# Patient Record
Sex: MALE | Race: WHITE | NOT HISPANIC OR LATINO | Employment: FULL TIME | URBAN - METROPOLITAN AREA
[De-identification: names, ages, dates, MRNs, and addresses within clinical notes are randomized per-mention and may not be internally consistent; named-entity substitution may affect disease eponyms.]

---

## 2021-06-21 ENCOUNTER — TELEPHONE (OUTPATIENT)
Dept: CARDIOLOGY | Facility: MEDICAL CENTER | Age: 61
End: 2021-06-21

## 2021-06-24 ENCOUNTER — OFFICE VISIT (OUTPATIENT)
Dept: CARDIOLOGY | Facility: MEDICAL CENTER | Age: 61
End: 2021-06-24
Payer: COMMERCIAL

## 2021-06-24 VITALS
HEART RATE: 78 BPM | OXYGEN SATURATION: 95 % | SYSTOLIC BLOOD PRESSURE: 166 MMHG | HEIGHT: 69 IN | DIASTOLIC BLOOD PRESSURE: 84 MMHG | BODY MASS INDEX: 31.1 KG/M2 | WEIGHT: 210 LBS

## 2021-06-24 DIAGNOSIS — R93.1 ELEVATED CORONARY ARTERY CALCIUM SCORE: ICD-10-CM

## 2021-06-24 DIAGNOSIS — E78.5 HYPERLIPIDEMIA, UNSPECIFIED HYPERLIPIDEMIA TYPE: ICD-10-CM

## 2021-06-24 DIAGNOSIS — I10 WHITE COAT SYNDROME WITH HYPERTENSION: ICD-10-CM

## 2021-06-24 DIAGNOSIS — Z72.0 TOBACCO ABUSE: ICD-10-CM

## 2021-06-24 DIAGNOSIS — E11.9 TYPE 2 DIABETES MELLITUS WITHOUT COMPLICATION, WITHOUT LONG-TERM CURRENT USE OF INSULIN (HCC): ICD-10-CM

## 2021-06-24 PROCEDURE — 93000 ELECTROCARDIOGRAM COMPLETE: CPT | Performed by: INTERNAL MEDICINE

## 2021-06-24 PROCEDURE — 99204 OFFICE O/P NEW MOD 45 MIN: CPT | Performed by: INTERNAL MEDICINE

## 2021-06-24 RX ORDER — ATORVASTATIN CALCIUM 10 MG/1
10 TABLET, FILM COATED ORAL NIGHTLY
COMMUNITY

## 2021-06-24 NOTE — PROGRESS NOTES
Chief Complaint   Patient presents with   • Abnormal Cardiac Function Testing     new patient       Subjective:   Jamin Gillis is a 61 y.o. male who presents today for initial consultation regarding abnormal coronary calcium scan.  Had a community screening test which revealed a coronary calcium score 260.  He is asymptomatic.  He has a history of borderline hypertension not on therapy, hyperlipidemia recently initiated on treatment after his calcium scan, tobacco abuse which is ongoing but no family history of precocious coronary artery disease other than his father.  He does not drink excessively and has not done any drugs in 10 years.  Feels well and is very active in his job.  No exertional complaints.    History reviewed. No pertinent past medical history.  History reviewed. No pertinent surgical history.  History reviewed. No pertinent family history.  Social History     Socioeconomic History   • Marital status: Single     Spouse name: Not on file   • Number of children: Not on file   • Years of education: Not on file   • Highest education level: Not on file   Occupational History   • Not on file   Tobacco Use   • Smoking status: Current Every Day Smoker   • Smokeless tobacco: Never Used   Substance and Sexual Activity   • Alcohol use: Not on file   • Drug use: Not on file   • Sexual activity: Not on file   Other Topics Concern   • Not on file   Social History Narrative   • Not on file     Social Determinants of Health     Financial Resource Strain:    • Difficulty of Paying Living Expenses:    Food Insecurity:    • Worried About Running Out of Food in the Last Year:    • Ran Out of Food in the Last Year:    Transportation Needs:    • Lack of Transportation (Medical):    • Lack of Transportation (Non-Medical):    Physical Activity:    • Days of Exercise per Week:    • Minutes of Exercise per Session:    Stress:    • Feeling of Stress :    Social Connections:    • Frequency of Communication with Friends  "and Family:    • Frequency of Social Gatherings with Friends and Family:    • Attends Judaism Services:    • Active Member of Clubs or Organizations:    • Attends Club or Organization Meetings:    • Marital Status:    Intimate Partner Violence:    • Fear of Current or Ex-Partner:    • Emotionally Abused:    • Physically Abused:    • Sexually Abused:      No Known Allergies  Outpatient Encounter Medications as of 6/24/2021   Medication Sig Dispense Refill   • Calcium Acetate, Phos Binder, (CALCIUM ACETATE PO) Take  by mouth.     • GARLIC OIL PO Take  by mouth.     • aspirin EC (ECOTRIN) 81 MG Tablet Delayed Response Take 81 mg by mouth every day.     • atorvastatin (LIPITOR) 10 MG Tab Take 10 mg by mouth every evening.       No facility-administered encounter medications on file as of 6/24/2021.     Review of Systems   All other systems reviewed and are negative.       Objective:   BP (!) 166/84 (BP Location: Left arm, Patient Position: Sitting)   Pulse 78   Ht 1.753 m (5' 9\")   Wt 95.3 kg (210 lb)   SpO2 95%   BMI 31.01 kg/m²     Physical Exam   Constitutional: He is oriented to person, place, and time. He appears well-developed. No distress.   HENT:   Head: Normocephalic and atraumatic.   Right Ear: External ear normal.   Left Ear: External ear normal.   Eyes: Pupils are equal, round, and reactive to light. Conjunctivae are normal. Right eye exhibits no discharge. Left eye exhibits no discharge. No scleral icterus.   Neck: No JVD present. No tracheal deviation present. No thyromegaly present.   Cardiovascular: Normal rate and regular rhythm. PMI is not displaced. Exam reveals no gallop and no friction rub.   No murmur heard.  Pulses:       Carotid pulses are 2+ on the right side and 2+ on the left side.       Radial pulses are 2+ on the left side.        Popliteal pulses are 2+ on the right side and 2+ on the left side.        Dorsalis pedis pulses are 2+ on the right side and 2+ on the left side.        " Posterior tibial pulses are 2+ on the right side and 2+ on the left side.   Pulmonary/Chest: Effort normal and breath sounds normal. No respiratory distress. He has no wheezes. He has no rales. He exhibits no tenderness.   Abdominal: Soft. Bowel sounds are normal. He exhibits no distension. There is no abdominal tenderness.   Musculoskeletal:         General: No swelling, tenderness or deformity. Normal range of motion.      Cervical back: Normal range of motion and neck supple.   Neurological: He is alert and oriented to person, place, and time. No cranial nerve deficit (cranial nerves II through XII grossly intact). Coordination normal.   Skin: Skin is warm and dry. No rash noted. He is not diaphoretic. No erythema. No pallor.   Psychiatric: His behavior is normal. Thought content normal.   Vitals reviewed.    EKG (6/24/2021):  I have personally reviewed the EKG this visit and discussed with the patient.  Sinus rhythm 73 bpm.  NORMAL ECG.    Labs 4/20/2021: Creatinine 0.85, AST 53, ALT 40, glucose 84, remainder of the complete metabolic panel is unremarkable.  , TG 86, HDL 48, , A1c 5.8 normal TSH.    Outside imaging body scan [facility] dated 4/27/2021 reviewed: Coronary calcium score 262.  Bilateral carotid artery plaquing left greater than right.  Possible 3.6 mm aneurysmal dilatation of the ascending aorta.      Assessment:     1. Elevated coronary artery calcium score  EC-ECHOCARDIOGRAM COMPLETE W/O CONT    US-CAROTID DOPPLER BILAT    Treadmill Stress   2. Hyperlipidemia, unspecified hyperlipidemia type  EKG    EC-ECHOCARDIOGRAM COMPLETE W/O CONT    US-CAROTID DOPPLER BILAT   3. Tobacco abuse  EC-ECHOCARDIOGRAM COMPLETE W/O CONT    US-CAROTID DOPPLER BILAT   4. White coat syndrome with hypertension  EC-ECHOCARDIOGRAM COMPLETE W/O CONT    US-CAROTID DOPPLER BILAT   5. Type 2 diabetes mellitus without complication, without long-term current use of insulin (HCC)         Medical Decision Making:   Today's Assessment / Status / Plan:     He is asymptomatic and active.  He has established coronary artery disease based on his coronary calcium score in the intermediate range at 262.  His ongoing tobacco abuse is a risk factor as well as likely underlying hypertension although it could just be whitecoat syndrome.  Indicates blood pressures in other situations have been normal.  Also has hyperlipidemia.  Was recently initiated on low-dose statin.  We discussed this is unlikely to achieve his target LDL goals but is a good start to ensure tolerance.  He will need follow-up for this with his PCP.  I do recommend carotid ultrasound due to the plaquing that is not further characterized on the screening event exam.  An echocardiogram should be sufficient to observe his ascending aorta.    1.  Echocardiogram with attention to ascending aorta  2.  Carotid Doppler  3.  Treadmill stress test  4.  Goal LDL less than 70  5.  Goal blood pressure 120/80    If the above testing is relatively normal I would recommend follow-up with his PCP for up titration of his statin therapy and reassessment for blood pressure therapy need.  Patient was instructed to monitor his blood pressures at home and bring these readings to his PCP.  Follow-up with cardiology yearly.    Thank you for this interesting consultation. It was my pleasure to see Jamin Gillis today.    Honorio Christiansen MD, FACC, [facility]  Division of Interventional Cardiology  [facility]

## 2021-06-28 LAB — EKG IMPRESSION: NORMAL

## 2021-09-15 ENCOUNTER — HOSPITAL ENCOUNTER (OUTPATIENT)
Dept: RADIOLOGY | Facility: MEDICAL CENTER | Age: 61
End: 2021-09-15
Attending: INTERNAL MEDICINE
Payer: COMMERCIAL

## 2021-09-15 ENCOUNTER — HOSPITAL ENCOUNTER (OUTPATIENT)
Dept: CARDIOLOGY | Facility: MEDICAL CENTER | Age: 61
End: 2021-09-15
Attending: INTERNAL MEDICINE
Payer: COMMERCIAL

## 2021-09-15 DIAGNOSIS — E78.5 HYPERLIPIDEMIA, UNSPECIFIED HYPERLIPIDEMIA TYPE: ICD-10-CM

## 2021-09-15 DIAGNOSIS — Z72.0 TOBACCO ABUSE: ICD-10-CM

## 2021-09-15 DIAGNOSIS — I10 WHITE COAT SYNDROME WITH HYPERTENSION: ICD-10-CM

## 2021-09-15 DIAGNOSIS — R93.1 ELEVATED CORONARY ARTERY CALCIUM SCORE: ICD-10-CM

## 2021-09-15 LAB
LV EJECT FRACT  99904: 60
LV EJECT FRACT MOD 2C 99903: 57.75
LV EJECT FRACT MOD 4C 99902: 60.52
LV EJECT FRACT MOD BP 99901: 59.03

## 2021-09-15 PROCEDURE — 93880 EXTRACRANIAL BILAT STUDY: CPT

## 2021-09-15 PROCEDURE — 93306 TTE W/DOPPLER COMPLETE: CPT | Mod: 26 | Performed by: INTERNAL MEDICINE

## 2021-09-15 PROCEDURE — 93306 TTE W/DOPPLER COMPLETE: CPT

## 2021-11-01 ENCOUNTER — TELEPHONE (OUTPATIENT)
Dept: SCHEDULING | Facility: IMAGING CENTER | Age: 61
End: 2021-11-01

## 2021-11-01 NOTE — TELEPHONE ENCOUNTER
TW      Pt called and wanted to check on the results of imaging tests that were completed on 09-. He has not heard anything about it and was concerned.    Best contact for pt:  PH: [phone]      Thank you,  Janett TREVINO

## 2021-11-02 ENCOUNTER — TELEPHONE (OUTPATIENT)
Dept: CARDIOLOGY | Facility: MEDICAL CENTER | Age: 61
End: 2021-11-02

## 2021-11-02 NOTE — TELEPHONE ENCOUNTER
----- Message from Marzena Stout R.N. sent at 11/2/2021 12:30 PM PDT -----  Regarding: reminder letter  Hi Team! Please send pt a reminder letter next year to call our office and schedule a 1-yr FV with TW in June. Preferred address:    [address] [place] [id number]    Thank you!

## 2021-11-02 NOTE — TELEPHONE ENCOUNTER
Called pt and discussed normal echo results. Advised per last OV with TW, follow up one year (June 2022). Advised per recommendations at last OV, he should follow up with PCP regarding statin and BP therapy. Pt says he is forgetful and would like a reminder for when he needs to schedule his FV. Advised will reach out to schedulers to mail him out a reminder. Pt confirmed his preferred address below:    [address] [place] [id number]    He says he still uses the [address] address on file which he usually checks 2-3 days a week. Pt verbalized understanding and was appreciative of call back.    Message sent to schedulers to mail out reminder letter for FV.